# Patient Record
Sex: FEMALE | Employment: UNEMPLOYED | URBAN - METROPOLITAN AREA
[De-identification: names, ages, dates, MRNs, and addresses within clinical notes are randomized per-mention and may not be internally consistent; named-entity substitution may affect disease eponyms.]

---

## 2017-01-09 ENCOUNTER — OFFICE VISIT (OUTPATIENT)
Dept: PAIN MANAGEMENT | Age: 42
End: 2017-01-09

## 2017-01-09 VITALS
SYSTOLIC BLOOD PRESSURE: 118 MMHG | DIASTOLIC BLOOD PRESSURE: 72 MMHG | HEIGHT: 66 IN | WEIGHT: 218 LBS | BODY MASS INDEX: 35.03 KG/M2 | HEART RATE: 73 BPM

## 2017-01-09 DIAGNOSIS — M47.816 SPONDYLOSIS OF LUMBAR REGION WITHOUT MYELOPATHY OR RADICULOPATHY: ICD-10-CM

## 2017-01-09 DIAGNOSIS — M79.7 FIBROMYALGIA: Primary | ICD-10-CM

## 2017-01-09 DIAGNOSIS — G89.29 CHRONIC BILATERAL LOW BACK PAIN WITHOUT SCIATICA: ICD-10-CM

## 2017-01-09 DIAGNOSIS — M25.561 CHRONIC PAIN OF BOTH KNEES: ICD-10-CM

## 2017-01-09 DIAGNOSIS — G89.29 CHRONIC PAIN OF BOTH KNEES: ICD-10-CM

## 2017-01-09 DIAGNOSIS — M54.2 CERVICALGIA: ICD-10-CM

## 2017-01-09 DIAGNOSIS — M54.50 CHRONIC BILATERAL LOW BACK PAIN WITHOUT SCIATICA: ICD-10-CM

## 2017-01-09 DIAGNOSIS — M25.562 CHRONIC PAIN OF BOTH KNEES: ICD-10-CM

## 2017-01-09 RX ORDER — MORPHINE SULFATE 15 MG/1
15 TABLET, FILM COATED, EXTENDED RELEASE ORAL EVERY 8 HOURS
Qty: 90 TAB | Refills: 0 | Status: SHIPPED | OUTPATIENT
Start: 2017-01-09 | End: 2017-01-09 | Stop reason: SDUPTHER

## 2017-01-09 RX ORDER — MORPHINE SULFATE 15 MG/1
15 TABLET, FILM COATED, EXTENDED RELEASE ORAL EVERY 8 HOURS
Qty: 90 TAB | Refills: 0 | Status: SHIPPED | OUTPATIENT
Start: 2017-02-07 | End: 2017-01-13

## 2017-01-09 RX ORDER — METAXALONE 800 MG/1
TABLET ORAL
Qty: 90 TAB | Refills: 1 | Status: SHIPPED | OUTPATIENT
Start: 2017-01-09 | End: 2017-01-13

## 2017-01-09 RX ORDER — GABAPENTIN 300 MG/1
300 CAPSULE ORAL 3 TIMES DAILY
Qty: 90 CAP | Refills: 3 | Status: SHIPPED | OUTPATIENT
Start: 2017-01-09

## 2017-01-09 NOTE — PATIENT INSTRUCTIONS
Plan:  Continue same medications as prescribed for chronic pain  We will restart physical therapy, starting with aquatic therapy and transitioning to land-based therapy as tolerated  Consider changing to levorphanol if morphine is not effective--this has much better data for pain control than morphine  Start Gabapentin pending okay from psychiatry--start with 300 mg nightly for 1 week then increase to twice daily.  This may be increased pending response  Follow up in 2 months or sooner if needed  Regular exercise and attention to emotional health and diet remain the most effective ways to treat chronic pain of all kinds  You may contact me with questions or concerns through [address]

## 2017-01-09 NOTE — PROGRESS NOTES
HISTORY OF PRESENT ILLNESS  Niya Anaya is a 35 y.o. female. Ms. Eveline Alvarado  returns today for follow up . Prior right shoulder injection helpful. No history of lumbar surgery or PT. Past medical history of PTSD, bipolar disorder, depression, and personality disorder. She is present with a caregiver, who has many questions to ask. Pain continues to predominate in multiple locations, including lower lumbar spine, hips, knees, and neck. Pain is described as throbbing, aching, and shooting. Pain is constant but worse with any physical exertion, stress, and inclement weather. Pt reports average of 10/10 pain scale most days, 7/10 with medications. She denies any new symptoms. She reports only a two day improvement with most recent lumbar IL-ALEX, which she notes improved pain by more than 50%. She may be a candidate for radiofrequency ablation in the future. She and her caregiver states that she wishes to restart physical therapy. We will start with aquatic therapy, transitioning to land-based therapy as tolerated. We discussed the important role of exercise in the management of fibromyalgia-related fatigue and pain as well as the importance of attention to stress reduction and emotional well-being. Sleep hygiene was also discussed. Morphine is modestly effective for pain. Niya Anaya is tolerating medications well, with the exception of possible pitting edema of the lower extremities. She is able to stay more active with less discomfort with these current doses. The patient reports an average of 50% relief with this regimen. Most recent UDS and  were consistent with prescribed medications. Pill counts are appropriate. She is informed of side effects, risks, and benefits of this regimen, and emphasizes that she derives a significant improvement in functionality and quality of life, and notes that non-opioid medications and therapies in the past have not offered significant benefit.  We will add Gabapentin pending approval from psychiatry--dosing discussed. A total of 40 minutes was spent with the patient of which more than 50% of the time was spent counseling the patient. HPI-see above    ROS  Constitutional: Positive for malaise/fatigue. Negative for chills and fever. Eyes: Negative for blurred vision, pain and discharge. Respiratory: Positive for shortness of breath and wheezing. Negative for cough. Cardiovascular: Positive for leg swelling. Negative for chest pain and palpitations. Gastrointestinal: Positive for constipation. Negative for nausea. Genitourinary: Negative for dysuria and urgency. Musculoskeletal: Positive for back pain, joint pain and neck pain. Skin: Positive for itching and rash. Neurological: Positive for weakness and headaches. Negative for sensory change and seizures. Endo/Heme/Allergies: Positive for environmental allergies. Does not bruise/bleed easily. Psychiatric/Behavioral: Positive for depression. Negative for suicidal ideas. The patient is nervous/anxious and has insomnia. Physical Exam   Constitutional: She is oriented to person, place, and time. She appears well-developed and well-nourished. No distress. HENT:   Head: Normocephalic and atraumatic. Eyes: EOM are normal.   Wears eyeglasses     Musculoskeletal:        Cervical back: She exhibits decreased range of motion and tenderness. Lumbar back: She exhibits decreased range of motion, tenderness, pain and spasm. Back:    Marked spasms of lumbar paraspinous musculature noted  Brisk sacroiliac tenderness noted  Trochanteric bursae tender to palpation bilaterally    Neurological: She is alert and oriented to person, place, and time. No cranial nerve deficit. She exhibits normal muscle tone. Coordination normal.   Psychiatric: She has a normal mood and affect. Her behavior is normal. Judgment and thought content normal.       ASSESSMENT and PLAN    ICD-10-CM ICD-9-CM    1.  Fibromyalgia M79.7 729.1 REFERRAL TO PHYSICAL THERAPY   2. Spondylosis of lumbar region without myelopathy or radiculopathy M47.816 721.3 REFERRAL TO PHYSICAL THERAPY   3. Chronic bilateral low back pain without sciatica M54.5 724.2 REFERRAL TO PHYSICAL THERAPY    G89.29 338.29    4. Chronic pain of both knees M25.561 719.46 REFERRAL TO PHYSICAL THERAPY    M25.562 338.29     G89.29     5. Cervicalgia M54.2 723.1 REFERRAL TO PHYSICAL THERAPY      Plan:  Continue same medications as prescribed for chronic pain  We will restart physical therapy, starting with aquatic therapy and transitioning to land-based therapy as tolerated  Consider changing to levorphanol if morphine is not effective--this has much better data for pain control than morphine  Start Gabapentin pending okay from psychiatry--start with 300 mg nightly for 1 week then increase to twice daily.  This may be increased pending response  Follow up in 2 months or sooner if needed  Regular exercise and attention to emotional health and diet remain the most effective ways to treat chronic pain of all kinds  You may contact me with questions or concerns through [address]

## 2017-01-09 NOTE — PROGRESS NOTES
Nursing Notes    Patient presents to the office today in follow-up. Patient rates her pain at 8/10 on the numerical pain scale. Reviewed medications with counts as follows:    Rx Date filled Qty Dispensed Pill Count Last Dose Short   Morphine sulfate ER 15 mg  12/19/16 60 16 today no                                POC UDS was not performed in office today    Any new labs or imaging since last appointment? NO    Have you been to an emergency room (ER) or urgent care clinic since your last visit? NO            Have you been hospitalized since your last visit? NO     If yes, where, when, and reason for visit? Have you seen or consulted any other health care providers outside of the [facility]  since your last visit? YES     If yes, where, when, and reason for visit? psychiatry    HM deferred to pcp.

## 2017-01-09 NOTE — MR AVS SNAPSHOT
Visit Information Date & Time Provider Department Dept. Phone Encounter #  
 1/9/2017  1:00 PM Deanna Urbina [facility] [phone] Your Appointments 3/6/2017 10:00 AM  
Follow Up with Lee Diamond PA-C [facility] (HOWARD SCHEDULING) Appt Note: FOLLOW UP  
 [address] [address]  
[phone] Paulette Summers [id number] Upcoming Health Maintenance Date Due  
 PAP AKA CERVICAL CYTOLOGY 12/10/1994 INFLUENZA AGE 9 TO ADULT 8/1/2016 DTaP/Tdap/Td series (2 - Td) 4/4/2026 Allergies as of 1/9/2017  Review Complete On: 1/9/2017 By: Bing Andersen LPN Severity Noted Reaction Type Reactions Butrans [Buprenorphine] High 09/08/2016    Anaphylaxis Hydrocodone  10/22/2015    Itching Oxycodone  03/24/2016    Other (comments)  
 migraines Current Immunizations  Never Reviewed Name Date Tdap 4/4/2016  4:43 PM  
  
 Not reviewed this visit You Were Diagnosed With   
  
 Codes Comments Fibromyalgia    -  Primary ICD-10-CM: M79.7 ICD-9-CM: 729.1 Spondylosis of lumbar region without myelopathy or radiculopathy     ICD-10-CM: M47.816 ICD-9-CM: 811. 3 Chronic bilateral low back pain without sciatica     ICD-10-CM: M54.5, G89.29 ICD-9-CM: 724.2, 338.29 Chronic pain of both knees     ICD-10-CM: M25.561, M25.562, G89.29 ICD-9-CM: 719.46, 338.29 Cervicalgia     ICD-10-CM: M54.2 ICD-9-CM: 723.1 Vitals BP Pulse Height(growth percentile) Weight(growth percentile) BMI OB Status 118/72 73 5' 6\" (1.676 m) 218 lb (98.9 kg) 35.19 kg/m2 Having regular periods Smoking Status Former Smoker Vitals History BMI and BSA Data Body Mass Index Body Surface Area  
 35.19 kg/m 2 2.15 m 2 Preferred Pharmacy Pharmacy Name Phone [id number]- [address] [phone] Your Updated Medication List  
  
   
This list is accurate as of: 1/9/17  2:00 PM.  Always use your most recent med list.  
  
  
  
  
 AZO PO Take  by mouth. Back Brace Misc 1 Film by Does Not Apply route daily. L5 - S1 slip vertebra grad 1  
  
 cetirizine 10 mg tablet Commonly known as:  ZYRTEC Take  by mouth. DEPAKOTE 500 mg tablet Generic drug:  divalproex DR Take  by mouth three (3) times daily. DULoxetine 60 mg capsule Commonly known as:  CYMBALTA Take 60 mg by mouth daily. FIORICET -40 mg per tablet Generic drug:  butalbital-acetaminophen-caffeine Take 1 Tab by mouth. TAKE 2 TABS Q 4 HRS PRN  
  
 fluticasone 50 mcg/actuation inhaler Commonly known as:  FLOVENT DISKUS Take  by inhalation. gabapentin 300 mg capsule Commonly known as:  NEURONTIN Take 1 Cap by mouth three (3) times daily. For fibromyalgia and nerve pain. hydrOXYzine pamoate 25 mg capsule Commonly known as:  VISTARIL Takes 50 mg in the am, 50 mg in the afternoon 75 mg HS  
  
 LASIX 40 mg tablet Generic drug:  furosemide Take  by mouth daily. lovastatin 20 mg tablet Commonly known as:  MEVACOR Take 20 mg by mouth nightly. metaxalone 800 mg tablet Commonly known as:  SKELAXIN  
TAKE 1 TABLET BY MOUTH 3 TIMES DAILY * morphine CR 15 mg CR tablet Commonly known as:  MS CONTIN Take 1 Tab by mouth every twelve (12) hours for 30 days. Max Daily Amount: 30 mg.  
  
 * morphine CR 15 mg CR tablet Commonly known as:  MS CONTIN Take 1 Tab by mouth every eight (8) hours for 30 days. Max Daily Amount: 45 mg. Start taking on:  2/7/2017  
  
 omeprazole 40 mg capsule Commonly known as:  PRILOSEC Take 40 mg by mouth daily. * prazosin 1 mg capsule Commonly known as:  MINIPRESS Take 2 mg by mouth nightly. * prazosin 2 mg capsule Commonly known as:  MINIPRESS  
  
 propranolol 40 mg tablet Commonly known as:  INDERAL Take  by mouth four (4) times daily. QUEtiapine 300 mg tablet Commonly known as:  SEROquel 600 mg nightly. traZODone 50 mg tablet Commonly known as:  Marcello Rasher Take 100 mg by mouth nightly. * Notice: This list has 4 medication(s) that are the same as other medications prescribed for you. Read the directions carefully, and ask your doctor or other care provider to review them with you. Prescriptions Printed Refills  
 morphine CR (MS CONTIN) 15 mg CR tablet 0 Starting on: 2/7/2017 Sig: Take 1 Tab by mouth every eight (8) hours for 30 days. Max Daily Amount: 45 mg.  
 Class: Print Route: Oral  
  
Prescriptions Sent to Pharmacy Refills  
 gabapentin (NEURONTIN) 300 mg capsule 3 Sig: Take 1 Cap by mouth three (3) times daily. For fibromyalgia and nerve pain. Class: Normal  
 Pharmacy: [address] [place] [address], [address] Ph #: [phone] Route: Oral  
 metaxalone (SKELAXIN) 800 mg tablet 1 Sig: TAKE 1 TABLET BY MOUTH 3 TIMES DAILY Class: Normal  
 Pharmacy: [address] [place] [address] Ph #: [phone] We Performed the Following REFERRAL TO PHYSICAL THERAPY [IFM16 Custom] Comments:  
 Please evaluate and treat patient for chronic, severe lower back, knee, and neck pain due to lumbar spondylosis, OA, and fibromyalgia. Pt wishes to start with aquatic therapy and transition to land based PT if tolerated. Please employ modalities as indicated. Referral Information Referral ID Referred By Referred To  
  
 [id number] [facility], [address] [place] [address], [address] [facility] Phone: [phone] Visits Status Start Date End Date 18 New Request 1/9/17 1/9/18 If your referral has a status of pending review or denied, additional information will be sent to support the outcome of this decision. Patient Instructions Plan: 
Continue same medications as prescribed for chronic pain We will restart physical therapy, starting with aquatic therapy and transitioning to land-based therapy as tolerated Consider changing to levorphanol if morphine is not effective--this has much better data for pain control than morphine Start Gabapentin pending okay from psychiatry--start with 300 mg nightly for 1 week then increase to twice daily. This may be increased pending response Follow up in 2 months or sooner if needed Regular exercise and attention to emotional health and diet remain the most effective ways to treat chronic pain of all kinds You may contact me with questions or concerns through [address] [facility] SERVICES! Dear Maine Chou: 
Thank you for requesting a iTOK account. Our records indicate that you already have an active iTOK account. You can access your account anytime at [email or web address] Did you know that you can access your hospital and ER discharge instructions at any time in iTOK? You can also review all of your test results from your hospital stay or ER visit. Additional Information If you have questions, please visit the Frequently Asked Questions section of the iTOK website at [email or web address] Remember, iTOK is NOT to be used for urgent needs. For medical emergencies, dial 911. Now available from your iPhone and Android! Please provide this summary of care documentation to your next provider. Your primary care clinician is listed as Manuela Grant. If you have any questions after today's visit, please call [phone].

## 2017-01-13 ENCOUNTER — HOSPITAL ENCOUNTER (EMERGENCY)
Age: 42
Discharge: HOME | End: 2017-01-14
Attending: EMERGENCY MEDICINE
Payer: SUBSIDIZED

## 2017-01-13 VITALS
BODY MASS INDEX: 34.39 KG/M2 | DIASTOLIC BLOOD PRESSURE: 79 MMHG | WEIGHT: 214 LBS | OXYGEN SATURATION: 99 % | TEMPERATURE: 98.8 F | RESPIRATION RATE: 17 BRPM | SYSTOLIC BLOOD PRESSURE: 116 MMHG | HEART RATE: 71 BPM | HEIGHT: 66 IN

## 2017-01-13 DIAGNOSIS — Z79.899 POLYPHARMACY: Primary | ICD-10-CM

## 2017-01-13 LAB
ALBUMIN SERPL BCP-MCNC: 3 G/DL (ref 3.4–5)
ALBUMIN/GLOB SERPL: 0.9 {RATIO} (ref 0.8–1.7)
ALP SERPL-CCNC: 81 U/L (ref 45–117)
ALT SERPL-CCNC: 23 U/L (ref 13–56)
AMPHET UR QL SCN: NEGATIVE
ANION GAP BLD CALC-SCNC: 8 MMOL/L (ref 3–18)
APAP SERPL-MCNC: <2 UG/ML (ref 10–30)
APPEARANCE UR: CLEAR
AST SERPL W P-5'-P-CCNC: 33 U/L (ref 15–37)
BACTERIA URNS QL MICRO: NEGATIVE /HPF
BARBITURATES UR QL SCN: POSITIVE
BASOPHILS # BLD AUTO: 0 K/UL (ref 0–0.06)
BASOPHILS # BLD: 0 % (ref 0–2)
BENZODIAZ UR QL: NEGATIVE
BILIRUB SERPL-MCNC: 0.2 MG/DL (ref 0.2–1)
BILIRUB UR QL: NEGATIVE
BUN SERPL-MCNC: 16 MG/DL (ref 7–18)
BUN/CREAT SERPL: 16 (ref 12–20)
CALCIUM SERPL-MCNC: 8.5 MG/DL (ref 8.5–10.1)
CANNABINOIDS UR QL SCN: NEGATIVE
CHLORIDE SERPL-SCNC: 102 MMOL/L (ref 100–108)
CO2 SERPL-SCNC: 30 MMOL/L (ref 21–32)
COCAINE UR QL SCN: NEGATIVE
COLOR UR: YELLOW
CREAT SERPL-MCNC: 1 MG/DL (ref 0.6–1.3)
DIFFERENTIAL METHOD BLD: ABNORMAL
EOSINOPHIL # BLD: 0.2 K/UL (ref 0–0.4)
EOSINOPHIL NFR BLD: 3 % (ref 0–5)
EPITH CASTS URNS QL MICRO: NORMAL /LPF (ref 0–5)
ERYTHROCYTE [DISTWIDTH] IN BLOOD BY AUTOMATED COUNT: 13.1 % (ref 11.6–14.5)
ETHANOL SERPL-MCNC: <3 MG/DL (ref 0–3)
GLOBULIN SER CALC-MCNC: 3.4 G/DL (ref 2–4)
GLUCOSE SERPL-MCNC: 109 MG/DL (ref 74–99)
GLUCOSE UR STRIP.AUTO-MCNC: NEGATIVE MG/DL
HCG UR QL: NEGATIVE
HCT VFR BLD AUTO: 36.2 % (ref 35–45)
HDSCOM,HDSCOM: ABNORMAL
HGB BLD-MCNC: 12 G/DL (ref 12–16)
HGB UR QL STRIP: ABNORMAL
KETONES UR QL STRIP.AUTO: ABNORMAL MG/DL
LEUKOCYTE ESTERASE UR QL STRIP.AUTO: NEGATIVE
LYMPHOCYTES # BLD AUTO: 51 % (ref 21–52)
LYMPHOCYTES # BLD: 2.4 K/UL (ref 0.9–3.6)
MCH RBC QN AUTO: 28.2 PG (ref 24–34)
MCHC RBC AUTO-ENTMCNC: 33.1 G/DL (ref 31–37)
MCV RBC AUTO: 85.2 FL (ref 74–97)
METHADONE UR QL: NEGATIVE
MONOCYTES # BLD: 0.9 K/UL (ref 0.05–1.2)
MONOCYTES NFR BLD AUTO: 19 % (ref 3–10)
NEUTS SEG # BLD: 1.2 K/UL (ref 1.8–8)
NEUTS SEG NFR BLD AUTO: 27 % (ref 40–73)
NITRITE UR QL STRIP.AUTO: NEGATIVE
OPIATES UR QL: POSITIVE
PCP UR QL: NEGATIVE
PH UR STRIP: 6 [PH] (ref 5–8)
PLATELET # BLD AUTO: 183 K/UL (ref 135–420)
PMV BLD AUTO: 10.3 FL (ref 9.2–11.8)
POTASSIUM SERPL-SCNC: 4 MMOL/L (ref 3.5–5.5)
PROT SERPL-MCNC: 6.4 G/DL (ref 6.4–8.2)
PROT UR STRIP-MCNC: NEGATIVE MG/DL
RBC # BLD AUTO: 4.25 M/UL (ref 4.2–5.3)
RBC #/AREA URNS HPF: NORMAL /HPF (ref 0–5)
SALICYLATES SERPL-MCNC: <2.8 MG/DL (ref 2.8–20)
SODIUM SERPL-SCNC: 140 MMOL/L (ref 136–145)
SP GR UR REFRACTOMETRY: >1.03 (ref 1–1.03)
UROBILINOGEN UR QL STRIP.AUTO: 1 EU/DL (ref 0.2–1)
WBC # BLD AUTO: 4.7 K/UL (ref 4.6–13.2)
WBC URNS QL MICRO: NORMAL /HPF (ref 0–4)

## 2017-01-13 PROCEDURE — 99284 EMERGENCY DEPT VISIT MOD MDM: CPT

## 2017-01-13 PROCEDURE — 80307 DRUG TEST PRSMV CHEM ANLYZR: CPT | Performed by: EMERGENCY MEDICINE

## 2017-01-13 PROCEDURE — 93005 ELECTROCARDIOGRAM TRACING: CPT

## 2017-01-13 PROCEDURE — 81001 URINALYSIS AUTO W/SCOPE: CPT | Performed by: EMERGENCY MEDICINE

## 2017-01-13 PROCEDURE — 81025 URINE PREGNANCY TEST: CPT | Performed by: EMERGENCY MEDICINE

## 2017-01-13 PROCEDURE — 85025 COMPLETE CBC W/AUTO DIFF WBC: CPT | Performed by: EMERGENCY MEDICINE

## 2017-01-13 PROCEDURE — 80053 COMPREHEN METABOLIC PANEL: CPT | Performed by: EMERGENCY MEDICINE

## 2017-01-13 RX ORDER — POLYETHYLENE GLYCOL 3350 17 G/17G
17 POWDER, FOR SOLUTION ORAL DAILY
COMMUNITY

## 2017-01-14 LAB
ATRIAL RATE: 63 BPM
CALCULATED P AXIS, ECG09: 13 DEGREES
CALCULATED R AXIS, ECG10: -27 DEGREES
CALCULATED T AXIS, ECG11: -39 DEGREES
DIAGNOSIS, 93000: NORMAL
P-R INTERVAL, ECG05: 170 MS
Q-T INTERVAL, ECG07: 422 MS
QRS DURATION, ECG06: 90 MS
QTC CALCULATION (BEZET), ECG08: 431 MS
VENTRICULAR RATE, ECG03: 63 BPM

## 2017-01-14 NOTE — ED TRIAGE NOTES
C/o tremors in hands that began 3 weeks ago, forgetfulness, has been talking to herself, increased falls, unable to make it to the bathroom in time with incontinent episodes, hallucinations x 2-3 days. Patient alert and oriented x4 in triage.

## 2017-02-10 ENCOUNTER — HOSPITAL ENCOUNTER (OUTPATIENT)
Dept: VASCULAR SURGERY | Age: 42
Discharge: HOME | End: 2017-02-10
Attending: FAMILY MEDICINE
Payer: SUBSIDIZED

## 2017-02-10 DIAGNOSIS — R60.0 EDEMA, LOWER EXTREMITY: ICD-10-CM

## 2017-02-10 PROCEDURE — 93970 EXTREMITY STUDY: CPT

## 2017-02-10 NOTE — PROCEDURES
[facility]  *** FINAL REPORT ***    Name: Yoko Lunsford  MRN: [id number]    Outpatient  : 10 Dec 1975  HIS Order #: [id number]  [address] [facility] Visit #: [id number]  Date: 10 Feb 2017    TYPE OF TEST: Peripheral Venous Testing    REASON FOR TEST  Edema    Right Leg:-  Deep venous thrombosis:           No  Superficial venous thrombosis:    No  Deep venous insufficiency:        No  Superficial venous insufficiency: Not examined    Left Leg:-  Deep venous thrombosis:           No  Superficial venous thrombosis:    No  Deep venous insufficiency:        No  Superficial venous insufficiency: Not examined      INTERPRETATION/FINDINGS  Duplex images were obtained using 2-D gray scale, color flow, and  spectral Doppler analysis. Right leg :  1. Deep vein(s) visualized include the common femoral, deep femoral,  proximal femoral, mid femoral, distal femoral, popliteal(above knee),  popliteal(fossa), popliteal(below knee), posterior tibial and peroneal   veins. 2. No evidence of deep venous thrombosis detected in the veins  visualized. 3. No evidence of superficial thrombosis detected. 4. No evidence of reflux detected in the deep veins visualized. Left leg :  1. Deep vein(s) visualized include the common femoral, deep femoral,  proximal femoral, mid femoral, distal femoral, popliteal(above knee),  popliteal(fossa), popliteal(below knee), posterior tibial and peroneal   veins. 2. No evidence of deep venous thrombosis detected in the veins  visualized. 3. No evidence of superficial thrombosis detected. 4. No evidence of reflux detected in the deep veins visualized. ADDITIONAL COMMENTS    I have personally reviewed the data relevant to the interpretation of  this  study. TECHNOLOGIST: Mahesh Mcclellan  Signed: 02/10/2017 09:14 AM    PHYSICIAN: Marino Whitley MD  Signed: 02/10/2017 02:37 PM

## 2017-03-15 ENCOUNTER — DOCUMENTATION ONLY (OUTPATIENT)
Dept: PAIN MANAGEMENT | Age: 42
End: 2017-03-15

## 2017-03-15 NOTE — PROGRESS NOTES
The office received a fax from [facility] in [place]. They had the pt scheduled for appts and she has cancelled and no showed several appts. The pt has not rescheduled.

## 2017-05-02 ENCOUNTER — DOCUMENTATION ONLY (OUTPATIENT)
Dept: PAIN MANAGEMENT | Age: 42
End: 2017-05-02

## 2017-05-02 NOTE — PROGRESS NOTES
Request for records from [facility] in [facility] Crystal Pearson and afx request to [employer] to be process on 05/02/2017.

## 2020-11-16 NOTE — ED PROVIDER NOTES
HPI Comments: 9:50 PM Deborah Velazco is a 35 y.o. female with a h/o chronic pain, bipolar I, skin cancer (ankle), insomnia, CKD, Crohn disease, depression, PTSD who presents to the ED with multiple complaints onset 3 weeks ago but has progressively gotten worse. Per caregiver, pt has been having \"intense jerking\" and tremors, incoherence and confusion, hallucinations X 3 days, incontinence X 1.5 weeks, and trouble ambulating (bilateral knee bruising secondary to falling). She also states that the pt is unable to hold anything in her hands without her dropping it to the floor. She reports the pt has been taking Fioricet, Lasix, Propranolol, Tramadol, Morphine, Gabapentin, Seroquel, and other medications. The pt c/o drowsiness, \"feeling drugged\", and tinnitus. Caregiver states that she thinks the pt is on too many medications. No other associated symptoms or modifying factors at this time. The history is provided by the patient and a parent. Past Medical History:   Diagnosis Date    Arthritis     Bipolar 1 disorder ([facility]     [facility]      LESION ON LEFT ANKLE     Chronic kidney disease      PAST KIDNEY INFECTIONS    Chronic pain     [facility]      November 2016    Depression     PTSD (post-traumatic stress disorder)        Past Surgical History:   Procedure Laterality Date    Hx gyn       TUBALIGATION    Hx other surgical       CANCER LESION ON LEFT ANKLE REMOVED         Family History:   Problem Relation Age of Onset    Hypertension Mother     Cancer Father     No Known Problems Sister     No Known Problems Brother     No Known Problems Sister     No Known Problems Sister     Cancer Sister     No Known Problems Sister        Social History     Social History    Marital status: LEGALLY      Spouse name: N/A    Number of children: N/A    Years of education: N/A     Occupational History    Not on file.      Social History Main Topics    Smoking status: Former Smoker    Smokeless tobacco: Never Used    Alcohol use No    Drug use: No    Sexual activity: Not on file     Other Topics Concern    Not on file     Social History Narrative         ALLERGIES: Butrans [buprenorphine]; Hydrocodone; and Oxycodone    Review of Systems   Unable to perform ROS: Mental status change       Vitals:    01/13/17 2102   BP: 116/79   Pulse: 71   Resp: 17   Temp: 98.8 °F (37.1 °C)   SpO2: 99%   Weight: 97.1 kg (214 lb)   Height: 5' 6\" (1.676 m)            Physical Exam   Constitutional: She appears well-developed. No distress. Drowsy-appearing, nad   HENT:   Head: Normocephalic and atraumatic. Eyes: EOM are normal.   Neck: Normal range of motion. Cardiovascular: Normal rate. Mild edema in lower extremities    Pulmonary/Chest: Effort normal and breath sounds normal. No respiratory distress. Abdominal: Soft. There is generalized tenderness. Musculoskeletal: Normal range of motion. Mechanically stable   Neurological: She is alert. No focal deficits noted  Able to answer basic questions  Able to follow basic commands   Psychiatric: Her behavior is normal.   Nursing note and vitals reviewed. MDM  Number of Diagnoses or Management Options  Polypharmacy:   Diagnosis management comments: 36 yo CF with PMHx chronic pain, bipolar presents with multiple complaints. Pt seems drowsy and does not provide much history. Family concerned for tremors, hallucinations, decreased appetite. Examination significant mainly for drowsy, though does answer basic questions and follow basic commands, no focal deficits. Suspect likely polypharmacy, evaluate for acute process. 1.  Cbc, cmp, etoh, tylenol and salicylate  2. Ua, uds, preg  3. EKG  4. Symptom management  5. Re-evaluate    12:22 AM  Work-up unremarkable. Pt does seem more alert. Discussed results and poc for dc home, decrease sedating meds, symptom management, follow-up, return precautions.          Amount and/or Complexity of Data Reviewed  Clinical lab tests: ordered and reviewed  Obtain history from someone other than the patient: yes    Patient Progress  Patient progress: improved    ED Course       EKG  Date/Time: 1/13/2017 10:34 PM  Performed by: Clarita Cheney  Authorized by: Clarita Cheney     ECG reviewed by ED Physician in the absence of a cardiologist: yes    Previous ECG:     Previous ECG:  Compared to current    Comparison ECG info:  10/23/16    Similarity:  No change  Interpretation:     Interpretation: normal    Rate:     ECG rate:  63    ECG rate assessment: normal    Rhythm:     Rhythm: sinus rhythm    Ectopy:     Ectopy: none    QRS:     QRS axis:  Left    QRS intervals:  Normal  Conduction:     Conduction: normal    ST segments:     ST segments:  Normal  T waves:     T waves: non-specific        Vitals:  Patient Vitals for the past 12 hrs:   Temp Pulse Resp BP SpO2   01/13/17 2102 98.8 °F (37.1 °C) 71 17 116/79 99 %     [employer] interpreted within normal limits.      Medications ordered:   Medications - No data to display      Lab findings:  Recent Results (from the past 12 hour(s))   URINALYSIS W/ RFLX MICROSCOPIC    Collection Time: 01/13/17 10:10 PM   Result Value Ref Range    Color YELLOW      Appearance CLEAR      Specific gravity >1.030 (H) 1.005 - 1.030    pH (UA) 6.0 5.0 - 8.0      Protein NEGATIVE  NEG mg/dL    Glucose NEGATIVE  NEG mg/dL    Ketone TRACE (A) NEG mg/dL    Bilirubin NEGATIVE  NEG      Blood MODERATE (A) NEG      Urobilinogen 1.0 0.2 - 1.0 EU/dL    Nitrites NEGATIVE  NEG      Leukocyte Esterase NEGATIVE  NEG     DRUG SCREEN, URINE    Collection Time: 01/13/17 10:10 PM   Result Value Ref Range    BENZODIAZEPINE NEGATIVE  NEG      BARBITURATES POSITIVE (A) NEG      THC (TH-CANNABINOL) NEGATIVE  NEG      OPIATES POSITIVE (A) NEG      PCP(PHENCYCLIDINE) NEGATIVE  NEG      COCAINE NEGATIVE  NEG      AMPHETAMINE NEGATIVE  NEG      METHADONE NEGATIVE       HDSCOM (NOTE)    HCG URINE, QL Collection Time: 01/13/17 10:10 PM   Result Value Ref Range    HCG urine, Ql. NEGATIVE  NEG     URINE MICROSCOPIC ONLY    Collection Time: 01/13/17 10:10 PM   Result Value Ref Range    WBC 0 to 2 0 - 4 /hpf    RBC 0 to 3 0 - 5 /hpf    Epithelial cells FEW 0 - 5 /lpf    Bacteria NEGATIVE  NEG /hpf   CBC WITH AUTOMATED DIFF    Collection Time: 01/13/17 10:18 PM   Result Value Ref Range    WBC 4.7 4.6 - 13.2 K/uL    RBC 4.25 4.20 - 5.30 M/uL    HGB 12.0 12.0 - 16.0 g/dL    HCT 36.2 35.0 - 45.0 %    MCV 85.2 74.0 - 97.0 FL    MCH 28.2 24.0 - 34.0 PG    MCHC 33.1 31.0 - 37.0 g/dL    RDW 13.1 11.6 - 14.5 %    PLATELET 478 323 - 961 K/uL    MPV 10.3 9.2 - 11.8 FL    NEUTROPHILS 27 (L) 40 - 73 %    LYMPHOCYTES 51 21 - 52 %    MONOCYTES 19 (H) 3 - 10 %    EOSINOPHILS 3 0 - 5 %    BASOPHILS 0 0 - 2 %    ABS. NEUTROPHILS 1.2 (L) 1.8 - 8.0 K/UL    ABS. LYMPHOCYTES 2.4 0.9 - 3.6 K/UL    ABS. MONOCYTES 0.9 0.05 - 1.2 K/UL    ABS. EOSINOPHILS 0.2 0.0 - 0.4 K/UL    ABS. BASOPHILS 0.0 0.0 - 0.06 K/UL    DF AUTOMATED     METABOLIC PANEL, COMPREHENSIVE    Collection Time: 01/13/17 10:18 PM   Result Value Ref Range    Sodium 140 136 - 145 mmol/L    Potassium 4.0 3.5 - 5.5 mmol/L    Chloride 102 100 - 108 mmol/L    CO2 30 21 - 32 mmol/L    Anion gap 8 3.0 - 18 mmol/L    Glucose 109 (H) 74 - 99 mg/dL    BUN 16 7.0 - 18 MG/DL    Creatinine 1.00 0.6 - 1.3 MG/DL    BUN/Creatinine ratio 16 12 - 20      GFR est AA >60 >60 ml/min/1.73m2    GFR est non-AA >60 >60 ml/min/1.73m2    Calcium 8.5 8.5 - 10.1 MG/DL    Bilirubin, total 0.2 0.2 - 1.0 MG/DL    ALT 23 13 - 56 U/L    AST 33 15 - 37 U/L    Alk.  phosphatase 81 45 - 117 U/L    Protein, total 6.4 6.4 - 8.2 g/dL    Albumin 3.0 (L) 3.4 - 5.0 g/dL    Globulin 3.4 2.0 - 4.0 g/dL    A-G Ratio 0.9 0.8 - 1.7     ETHYL ALCOHOL    Collection Time: 01/13/17 10:18 PM   Result Value Ref Range    ALCOHOL(ETHYL),SERUM <3 0 - 3 MG/DL   ACETAMINOPHEN    Collection Time: 01/13/17 10:18 PM   Result Value Ref Range    ACETAMINOPHEN <2 (L) 10 - 30 ug/mL   SALICYLATE    Collection Time: 01/13/17 10:18 PM   Result Value Ref Range    SALICYLATE <2.9 (L) 2.8 - 20.0 MG/DL   EKG, 12 LEAD, INITIAL    Collection Time: 01/13/17 10:29 PM   Result Value Ref Range    Ventricular Rate 63 BPM    Atrial Rate 63 BPM    P-R Interval 170 ms    QRS Duration 90 ms    Q-T Interval 422 ms    QTC Calculation (Bezet) 431 ms    Calculated P Axis 13 degrees    Calculated R Axis -27 degrees    Calculated T Axis -39 degrees    Diagnosis       Normal sinus rhythm  Nonspecific T wave abnormality  Abnormal ECG  When compared with ECG of 23-OCT-2016 15:31,  Vent. rate has decreased BY  34 BPM  Inverted T waves have replaced nonspecific T wave abnormality in Inferior   leads  Nonspecific T wave abnormality now evident in Anterolateral leads       Reevaluation of patient:   12:15 AM I have reassessed the patient and discussed their results and diagnosis. Pt will be discharged in stable condition. Patient is to return to emergency department if any new or worsening condition. Patient understands and verbalizes agreement with plan. Disposition:  Diagnosis:   1. Polypharmacy      Disposition: Discharged    Follow-up Information     Follow up With Details Comments [address] Roslyn Montero MD Schedule an appointment as soon as possible for a visit  [facility]  [id number] [phone]      [facility] EMERGENCY DEPT Go to As needed, If symptoms worsen [address]  [phone]            Patient's Medications   Start Taking    No medications on file   Continue Taking    BACK BRACE MISC    1 Film by Does Not Apply route daily. L5 - S1 slip vertebra grad 1    CETIRIZINE (ZYRTEC) 10 MG TABLET    Take  by mouth. DIVALPROEX DR (DEPAKOTE) 500 MG TABLET    Take  by mouth three (3) times daily. DULOXETINE (CYMBALTA) 60 MG CAPSULE    Take 60 mg by mouth daily.     FLUTICASONE (FLOVENT DISKUS) 50 MCG/ACTUATION INHALER    Take  by inhalation. FUROSEMIDE (LASIX) 40 MG TABLET    Take  by mouth daily. GABAPENTIN (NEURONTIN) 300 MG CAPSULE    Take 1 Cap by mouth three (3) times daily. For fibromyalgia and nerve pain. LOVASTATIN (MEVACOR) 20 MG TABLET    Take 20 mg by mouth nightly. OMEPRAZOLE (PRILOSEC) 40 MG CAPSULE    Take 40 mg by mouth daily. PHENAZOPYRIDINE HCL (AZO PO)    Take  by mouth. POLYETHYLENE GLYCOL (MIRALAX) 17 GRAM PACKET    Take 17 g by mouth daily. PRAZOSIN (MINIPRESS) 1 MG CAPSULE    Take 2 mg by mouth nightly. PRAZOSIN (MINIPRESS) 2 MG CAPSULE    Take 5 mg by mouth. PROPRANOLOL (INDERAL) 40 MG TABLET    Take  by mouth four (4) times daily. QUETIAPINE (SEROQUEL) 300 MG TABLET    600 mg nightly. TRAZODONE (DESYREL) 50 MG TABLET    Take 100 mg by mouth nightly. These Medications have changed    No medications on file   Stop Taking    BUTALBITAL-ACETAMINOPHEN-CAFFEINE (FIORICET) -40 MG PER TABLET    Take 1 Tab by mouth. TAKE 2 TABS Q 4 HRS PRN    HYDROXYZINE (VISTARIL) 25 MG CAPSULE    Takes 50 mg in the am, 50 mg in the afternoon 75 mg HS    METAXALONE (SKELAXIN) 800 MG TABLET    TAKE 1 TABLET BY MOUTH 3 TIMES DAILY    MORPHINE CR (MS CONTIN) 15 MG CR TABLET    Take 1 Tab by mouth every twelve (12) hours for 30 days. Max Daily Amount: 30 mg. MORPHINE CR (MS CONTIN) 15 MG CR TABLET    Take 1 Tab by mouth every eight (8) hours for 30 days. Max Daily Amount: 45 mg. SCRIBE ATTESTATION STATEMENT  Documented by: Sumit Moreira for, and in the presence of, Alida Lesch, MD 9:50 PM    Signed by: Jeffrey Addison, 01/13/17 9:50 PM    PROVIDER ATTESTATION STATEMENT  I personally performed the services described in the documentation, reviewed the documentation, as recorded by the scribe in my presence, and it accurately and completely records my words and actions.   Alida Lesch, MD Quality 431: Preventive Care And Screening: Unhealthy Alcohol Use - Screening: Unhealthy alcohol use screening not performed, reason not otherwise specified Quality 226: Preventive Care And Screening: Tobacco Use: Screening And Cessation Intervention: Patient screened for tobacco use and is an ex/non-smoker Detail Level: Generalized Quality 402: Tobacco Use And Help With Quitting Among Adolescents: Patient screened for tobacco and never smoked